# Patient Record
Sex: MALE | Race: WHITE
[De-identification: names, ages, dates, MRNs, and addresses within clinical notes are randomized per-mention and may not be internally consistent; named-entity substitution may affect disease eponyms.]

---

## 2017-02-24 ENCOUNTER — HOSPITAL ENCOUNTER (EMERGENCY)
Dept: HOSPITAL 10 - FTE | Age: 22
LOS: 1 days | Discharge: HOME | End: 2017-02-25
Payer: COMMERCIAL

## 2017-02-24 VITALS
HEIGHT: 65 IN | WEIGHT: 168.43 LBS | BODY MASS INDEX: 28.06 KG/M2 | BODY MASS INDEX: 28.06 KG/M2 | WEIGHT: 168.43 LBS | HEIGHT: 65 IN
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
## 2017-02-24 DIAGNOSIS — S13.4XXA: Primary | ICD-10-CM

## 2017-02-24 PROCEDURE — 96372 THER/PROPH/DIAG INJ SC/IM: CPT

## 2017-02-25 VITALS
TEMPERATURE: 97.3 F | RESPIRATION RATE: 18 BRPM | HEART RATE: 61 BPM | SYSTOLIC BLOOD PRESSURE: 124 MMHG | DIASTOLIC BLOOD PRESSURE: 69 MMHG
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
## 2019-07-14 ENCOUNTER — HOSPITAL ENCOUNTER (EMERGENCY)
Dept: HOSPITAL 91 - FTE | Age: 24
Discharge: HOME | End: 2019-07-14
Payer: COMMERCIAL

## 2019-07-14 ENCOUNTER — HOSPITAL ENCOUNTER (EMERGENCY)
Dept: HOSPITAL 10 - FTE | Age: 24
Discharge: HOME | End: 2019-07-14
Payer: COMMERCIAL

## 2019-07-14 VITALS — SYSTOLIC BLOOD PRESSURE: 151 MMHG | HEART RATE: 103 BPM | DIASTOLIC BLOOD PRESSURE: 68 MMHG | RESPIRATION RATE: 20 BRPM

## 2019-07-14 VITALS
WEIGHT: 175.05 LBS | HEIGHT: 65 IN | BODY MASS INDEX: 29.16 KG/M2 | WEIGHT: 175.05 LBS | HEIGHT: 65 IN | BODY MASS INDEX: 29.16 KG/M2

## 2019-07-14 DIAGNOSIS — S50.861A: Primary | ICD-10-CM
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
## 2019-07-14 PROCEDURE — 96372 THER/PROPH/DIAG INJ SC/IM: CPT

## 2019-07-14 PROCEDURE — 99284 EMERGENCY DEPT VISIT MOD MDM: CPT

## 2019-07-14 RX ADMIN — DEXAMETHASONE SODIUM PHOSPHATE 1 MG: 10 INJECTION, SOLUTION INTRAMUSCULAR; INTRAVENOUS at 19:20

## 2019-07-14 RX ADMIN — CEPHALEXIN 1 MG: 500 CAPSULE ORAL at 19:18

## 2019-07-14 RX ADMIN — DIPHENHYDRAMINE HYDROCHLORIDE 1 MG: 50 CAPSULE ORAL at 19:18

## 2019-07-14 NOTE — ERD
ER Documentation


Chief Complaint


Chief Complaint





BUG BITES ON RIGHT ARM X'S 1 DAY





HPI


24-year-old male presents after getting bit by insects while working outside in 


a brush yesterday.  He states that he has fairly severe reactions to bee stings.


 Denies any shortness of breath, fevers.  Is a lesion on his right forearm and 


right hip.





ROS


All systems reviewed and are negative except as per history of present illness.





Medications


Home Meds


Active Scripts


Triamcinolone Acetonide (Triamcinolone Acetonide) 0.1% - 15 Gm Cream.gm., 1 


APPLIC TOP QID for 5 Days, #1 TUB


   Prov:BAMBI COOPER MD         7/14/19


Diphenhydramine Hcl* (Benadryl*) 25 Mg Cap, 25 MG PO Q6, #20 CAP


   Prov:BAMBI COOPER MD         7/14/19


Cyclobenzaprine Hcl* (Cyclobenzaprine Hcl*) 10 Mg Tablet, 10 MG PO TID, #15 TAB


   Prov:LOUANN MCDANIELS PA-C         2/25/17


Ibuprofen* (Ibuprofen*) 600 Mg Tablet, 600 MG PO Q6H PRN for PAIN, #30 TAB


   Prov:LOUANN MCDANIELS PA-C         2/25/17





Allergies


Allergies:  


Coded Allergies:  


     Sulfa (Sulfonamide Antibiotics) (Verified  Allergy, Unknown, swelling/rash,


2/24/17)





PMhx/Soc


Medical and Surgical Hx:  pt denies Medical Hx, pt denies Surgical Hx


History of Surgery:  No


Anesthesia Reaction:  No


Hx Neurological Disorder:  No


Hx Respiratory Disorders:  No


Hx Cardiac Disorders:  No


Hx Psychiatric Problems:  No


Hx Miscellaneous Medical Probl:  No (DENIES MED AND SURG HX.)


Hx Alcohol Use:  Yes (OCC)


Hx Substance Use:  Yes (MEDICAL [facility])


Hx Tobacco Use:  No


Smoking Status:  Never smoker





FmHx


Family History:  No diabetes, No coronary disease, No other





Physical Exam


Vitals





Vital Signs


  Date      Temp  Pulse  Resp  B/P (MAP)   Pulse Ox  O2          O2 Flow    FiO2


Time                                                 Delivery    Rate


   7/14/19  98.9    103    20      151/68        99


     19:02                           (95)





Physical Exam


Const:   No acute distress


Head:   Atraumatic 


Eyes:    Normal Conjunctiva


ENT:    Normal External Ears, Nose and Mouth.


Neck:               Full range of motion. No meningismus.


Resp:   Clear to auscultation bilaterally


Cardio:   Regular rate and rhythm, no murmurs


Abd:    Soft, non tender, non distended. Normal bowel sounds


Skin:   No petechiae or rashes.  Redness and warmth on the right forearm with a 


small central area of weeping.  Similar lesion on the right lateral hip or iliac


area.  No streaking, fluctuance, active discharge.


Back:   No midline or flank tenderness


Ext:    No cyanosis, or edema


Neur:   Awake and alert


Psych:    Normal Mood and Affect


Results 24 hrs





Current Medications


 Medications
   Dose
          Sig/Gretchen
       Start Time
   Status  Last


 (Trade)       Ordered        Route
 PRN     Stop Time              Admin
Dose


                              Reason                                Admin


                10 mg          ONCE  ONCE
    7/14/19                



Dexamethasone                 IM
            19:30




  (Decadron)                                7/14/19 19:31


                50 mg          ONCE  ONCE
    7/14/19                



Diphenhydrami                 PO
            19:30



ne
 HCl
                                     7/14/19 19:31


(Benadryl)


 Cephalexin
    500 mg         ONCE  ONCE
    7/14/19                



(Keflex)                      PO
            19:30



                                             7/14/19 19:31








Procedures/MDM


Patient presents with most likely appears to be a local reaction to insect bite 


sustained yesterday.  Secondary infection cannot be ruled out so he will be 


treated empirically with Keflex, Decadron 10 mg IM here, Benadryl, 


recommendations for cold compresses and return precautions for worsening 


redness, fevers, new or worsening symptoms.  Current signs or symptoms does not 


suggest severe sepsis, anaphylaxis, life-threatening rashes, additional 


concerning signs or symptoms.  The patient was stable with no new complaints 


during the ER course. Clinically, there is no current evidence to suggest 


meningitis, sepsis, acute abdomen, pneumonia, stroke,  acute coronary syndrome, 


pulmonary embolism, aortic dissection or any other emergent condition appearing 


to require further evaluation or hospitalization.  Patient counseled regarding 


my diagnostic impression and care plan. Prior to discharge all questions 


answered. Pt agrees with treatment plan and understands strict return 


precautions. Pt is instructed to follow up with primary care provider within 24-


48 hours. Precautionary instructions provided including instructions to return 


to the ER if not improving or for any worsening or changing symptoms or 


concerns.





Disclaimer: Inadvertent spelling and grammatical errors are likely due to 


EHR/dictation software use and do not reflect on the overall quality of patient 


care. Also, please note that the electronic time recorded on this note does not 


necessarily reflect the actual time of the patient encounter.





Departure


Diagnosis:  


   Primary Impression:  


   Insect bite


   Encounter type:  initial encounter  Site of insect bite:  unspecified site  


   Qualified Codes:  W57.XXXA - Bitten or stung by nonvenomous insect and other 


   nonvenomous arthropods, initial encounter


Condition:  Stable


Patient Instructions:  Insect Bites and Stings, Insect Sting/Bite, Infected





Additional Instructions:  


Likely local reaction but will treat for infection as well.  Recheck for 


worsening redness, fevers, new worsening symptoms.  Apply cold compresses or ice


at home.











BAMBI COOPER MD             Jul 14, 2019 19:17